# Patient Record
Sex: FEMALE | Race: WHITE | NOT HISPANIC OR LATINO | URBAN - METROPOLITAN AREA
[De-identification: names, ages, dates, MRNs, and addresses within clinical notes are randomized per-mention and may not be internally consistent; named-entity substitution may affect disease eponyms.]

---
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
## 2019-03-21 ENCOUNTER — APPOINTMENT (OUTPATIENT)
Dept: PEDIATRICS | Facility: CLINIC | Age: 19
End: 2019-03-21
Payer: COMMERCIAL

## 2019-03-21 VITALS — TEMPERATURE: 97 F

## 2019-03-21 DIAGNOSIS — J02.9 ACUTE PHARYNGITIS, UNSPECIFIED: ICD-10-CM

## 2019-03-21 DIAGNOSIS — Z20.828 CONTACT WITH AND (SUSPECTED) EXPOSURE TO OTHER VIRAL COMMUNICABLE DISEASES: ICD-10-CM

## 2019-03-21 LAB — S PYO AG SPEC QL IA: NEGATIVE

## 2019-03-21 PROCEDURE — 87880 STREP A ASSAY W/OPTIC: CPT | Mod: QW

## 2019-03-21 PROCEDURE — 99215 OFFICE O/P EST HI 40 MIN: CPT

## 2019-03-21 RX ORDER — AMOXICILLIN 500 MG/1
500 CAPSULE ORAL
Qty: 28 | Refills: 0 | Status: COMPLETED | COMMUNITY
Start: 2018-11-12

## 2019-03-21 RX ORDER — OSELTAMIVIR PHOSPHATE 75 MG/1
75 CAPSULE ORAL
Qty: 10 | Refills: 0 | Status: DISCONTINUED | COMMUNITY
Start: 2018-01-30 | End: 2019-03-21

## 2019-03-21 NOTE — HISTORY OF PRESENT ILLNESS
[de-identified] : Rash and swelling of hands and ankles [FreeTextEntry6] : 2 nights ago pt started with a red blotchy itchy rash on elbows, the next day the rash was on her ankles, wrists and knees.  Pt also noted swelling of her fingers and they felt tight when bending.  In the past 4 months she has noted some swelling of her fingers and knuckles in AM which resolved within less than an hours.  Pt had a runny or stuffy nose x 5 days, mild ST pain 2/10, no fever, no body aches, no chills, no N/V/D.  No SA.  HA 6 days ago x 1 day took Tylenol.  Started a new Vitamin [employer] MVI gummies with collagen.  Pt took Benadryl last night which helped a little with itch.  Pt has allergies to Augmentin and Erythromycin.  Had flushed cheeks yesterday, rash on ear lobes.

## 2019-03-21 NOTE — PHYSICAL EXAM
[Clear Effusion] : clear effusion [Erythematous Oropharynx] : erythematous oropharynx [Tender] : tender [Enlarged] : enlarged [Anterior Cervical] : anterior cervical [Moves All Extremities x 4] : moves all extremities x4 [NL] : normotonic [FreeTextEntry4] : nasal congestion [de-identified] : red macular lacy rash on dorsum feet, wrists, elbows and knees.  No edema.  FROM elbows, wrists, knees and ankles, no pitting edema or increased warmth.

## 2019-03-21 NOTE — REVIEW OF SYSTEMS
[Headache] : headache [Nasal Congestion] : nasal congestion [Sore Throat] : sore throat [Rash] : rash [Itching] : itching [Negative] : Genitourinary

## 2019-03-23 LAB
B19V IGG SER QL IA: 0.3 INDEX
B19V IGG+IGM SER-IMP: NEGATIVE
B19V IGG+IGM SER-IMP: NORMAL
B19V IGM FLD-ACNC: 0.2 INDEX
B19V IGM SER-ACNC: NEGATIVE
BASOPHILS # BLD AUTO: 0.02 K/UL
BASOPHILS NFR BLD AUTO: 0.2 %
CRP SERPL-MCNC: <0.1 MG/DL
DSDNA AB SER-ACNC: <12 IU/ML
EOSINOPHIL # BLD AUTO: 0.08 K/UL
EOSINOPHIL NFR BLD AUTO: 1 %
ERYTHROCYTE [SEDIMENTATION RATE] IN BLOOD BY WESTERGREN METHOD: 2 MM/HR
HCT VFR BLD CALC: 39.8 %
HGB BLD-MCNC: 12.2 G/DL
IMM GRANULOCYTES NFR BLD AUTO: 0.2 %
LYMPHOCYTES # BLD AUTO: 2.04 K/UL
LYMPHOCYTES NFR BLD AUTO: 24.5 %
MAN DIFF?: NORMAL
MCHC RBC-ENTMCNC: 23.7 PG
MCHC RBC-ENTMCNC: 30.7 GM/DL
MCV RBC AUTO: 77.4 FL
MONOCYTES # BLD AUTO: 0.49 K/UL
MONOCYTES NFR BLD AUTO: 5.9 %
NEUTROPHILS # BLD AUTO: 5.66 K/UL
NEUTROPHILS NFR BLD AUTO: 68.2 %
PLATELET # BLD AUTO: 259 K/UL
RBC # BLD: 5.14 M/UL
RBC # FLD: 15.1 %
RHEUMATOID FACT SER QL: <10 IU/ML
T4 FREE SERPL-MCNC: 1.3 NG/DL
TSH SERPL-ACNC: 1.15 UIU/ML
WBC # FLD AUTO: 8.31 K/UL

## 2019-03-24 LAB — BACTERIA THROAT CULT: NORMAL

## 2019-03-25 ENCOUNTER — RESULT REVIEW (OUTPATIENT)
Age: 19
End: 2019-03-25

## 2019-03-25 LAB
ANA SER IF-ACNC: NEGATIVE
B BURGDOR AB SER-IMP: NEGATIVE
B BURGDOR IGM PATRN SER IB-IMP: NEGATIVE
B BURGDOR18/20KD IGM SER QL IB: NORMAL
B BURGDOR18KD IGG SER QL IB: NORMAL
B BURGDOR23KD IGG SER QL IB: NORMAL
B BURGDOR23KD IGM SER QL IB: NORMAL
B BURGDOR28KD AB SER QL IB: NORMAL
B BURGDOR28KD IGG SER QL IB: NORMAL
B BURGDOR30KD AB SER QL IB: NORMAL
B BURGDOR30KD IGG SER QL IB: NORMAL
B BURGDOR31KD IGG SER QL IB: NORMAL
B BURGDOR31KD IGM SER QL IB: NORMAL
B BURGDOR39KD IGG SER QL IB: NORMAL
B BURGDOR39KD IGM SER QL IB: NORMAL
B BURGDOR41KD IGG SER QL IB: PRESENT
B BURGDOR41KD IGM SER QL IB: PRESENT
B BURGDOR45KD AB SER QL IB: NORMAL
B BURGDOR45KD IGG SER QL IB: NORMAL
B BURGDOR58KD AB SER QL IB: NORMAL
B BURGDOR58KD IGG SER QL IB: NORMAL
B BURGDOR66KD IGG SER QL IB: NORMAL
B BURGDOR66KD IGM SER QL IB: NORMAL
B BURGDOR93KD IGG SER QL IB: NORMAL
B BURGDOR93KD IGM SER QL IB: NORMAL

## 2019-05-20 PROBLEM — J06.9 ACUTE URI: Status: RESOLVED | Noted: 2019-03-21 | Resolved: 2019-05-20

## 2019-05-20 PROBLEM — Z87.2 HISTORY OF VIRAL EXANTHEM: Status: RESOLVED | Noted: 2019-03-21 | Resolved: 2019-05-20

## 2019-05-20 PROBLEM — Z87.39 HISTORY OF JOINT SWELLING: Status: RESOLVED | Noted: 2019-03-21 | Resolved: 2019-05-20

## 2019-05-20 PROBLEM — B08.3 FIFTH DISEASE: Status: RESOLVED | Noted: 2019-03-21 | Resolved: 2019-05-20

## 2019-05-21 ENCOUNTER — APPOINTMENT (OUTPATIENT)
Dept: PEDIATRICS | Facility: CLINIC | Age: 19
End: 2019-05-21

## 2019-05-21 DIAGNOSIS — Z87.2 PERSONAL HISTORY OF DISEASES OF THE SKIN AND SUBCUTANEOUS TISSUE: ICD-10-CM

## 2019-05-21 DIAGNOSIS — Z87.39 PERSONAL HISTORY OF OTHER DISEASES OF THE MUSCULOSKELETAL SYSTEM AND CONNECTIVE TISSUE: ICD-10-CM

## 2019-05-21 DIAGNOSIS — B08.3 ERYTHEMA INFECTIOSUM [FIFTH DISEASE]: ICD-10-CM

## 2019-05-21 DIAGNOSIS — J06.9 ACUTE UPPER RESPIRATORY INFECTION, UNSPECIFIED: ICD-10-CM

## 2019-09-20 ENCOUNTER — APPOINTMENT (OUTPATIENT)
Dept: PEDIATRICS | Facility: CLINIC | Age: 19
End: 2019-09-20
Payer: COMMERCIAL

## 2019-09-20 VITALS — TEMPERATURE: 98.1 F

## 2019-09-20 PROCEDURE — 99214 OFFICE O/P EST MOD 30 MIN: CPT

## 2019-09-20 RX ORDER — HYDROCORTISONE VALERATE 2 MG/G
0.2 CREAM TOPICAL
Qty: 15 | Refills: 0 | Status: ACTIVE | COMMUNITY
Start: 2019-09-06

## 2019-09-20 RX ORDER — CLINDAMYCIN PHOSPHATE 10 MG/ML
1 LOTION TOPICAL
Qty: 60 | Refills: 0 | Status: ACTIVE | COMMUNITY
Start: 2019-09-06

## 2019-09-20 NOTE — DISCUSSION/SUMMARY
[FreeTextEntry1] : 20 y/o F with Left Otalgia/TMJ tenderness/Possible wisdom teeth causing pain-\par Warm compresses or heating pad/Massage and acupressure discussed/Soft foods/Soups/Smoothies/Increase clear fluids/Advil or Motrin every 6-8 hours as needed for the next few days\par Advised to make appt with dentist to evaluate wisdom teeth\par Check back any concerns.

## 2019-09-20 NOTE — HISTORY OF PRESENT ILLNESS
[de-identified] : Ear pain [FreeTextEntry6] : Started with left ear clogged about 3 days ago with decreased hearing.  Mild nasal congestion.  Sneezing.  Mild HAS.  No sore throat.  No coughing.  No CP/SOB.  No SA/N/V/D/C/loose stools.  No one else sick at home.  Sick contacts at school.

## 2019-09-20 NOTE — REVIEW OF SYSTEMS
[Headache] : headache [Ear Pain] : ear pain [Nasal Congestion] : nasal congestion [Negative] : Heme/Lymph

## 2019-09-20 NOTE — PHYSICAL EXAM
[No Acute Distress] : no acute distress [Alert] : alert [Normocephalic] : normocephalic [EOMI] : EOMI [Clear TM bilaterally] : clear tympanic membranes bilaterally [Pink Nasal Mucosa] : pink nasal mucosa [Nonerythematous Oropharynx] : nonerythematous oropharynx [Supple] : supple [Clear to Ausculatation Bilaterally] : clear to auscultation bilaterally [Regular Rate and Rhythm] : regular rate and rhythm [Soft] : soft [NonTender] : non tender

## 2019-11-07 PROBLEM — H92.02 LEFT EAR PAIN: Status: RESOLVED | Noted: 2019-09-20 | Resolved: 2019-11-07

## 2019-11-07 PROBLEM — M26.622 TENDERNESS OF LEFT TEMPOROMANDIBULAR JOINT: Status: RESOLVED | Noted: 2019-09-20 | Resolved: 2019-11-07

## 2019-11-07 PROBLEM — Z00.00 WELL ADULT EXAM: Status: ACTIVE | Noted: 2017-10-13

## 2019-11-14 ENCOUNTER — APPOINTMENT (OUTPATIENT)
Dept: PEDIATRICS | Facility: CLINIC | Age: 19
End: 2019-11-14
Payer: COMMERCIAL

## 2019-11-14 VITALS
HEART RATE: 67 BPM | BODY MASS INDEX: 32.24 KG/M2 | DIASTOLIC BLOOD PRESSURE: 68 MMHG | WEIGHT: 203 LBS | SYSTOLIC BLOOD PRESSURE: 114 MMHG | HEIGHT: 66.5 IN

## 2019-11-14 DIAGNOSIS — Z23 ENCOUNTER FOR IMMUNIZATION: ICD-10-CM

## 2019-11-14 DIAGNOSIS — M26.622 ARTHRALGIA OF LEFT TEMPOROMANDIBULAR JOINT: ICD-10-CM

## 2019-11-14 DIAGNOSIS — H92.02 OTALGIA, LEFT EAR: ICD-10-CM

## 2019-11-14 DIAGNOSIS — Z00.00 ENCOUNTER FOR GENERAL ADULT MEDICAL EXAMINATION W/OUT ABNORMAL FINDINGS: ICD-10-CM

## 2019-11-14 DIAGNOSIS — E66.3 OVERWEIGHT: ICD-10-CM

## 2019-11-14 PROCEDURE — 90686 IIV4 VACC NO PRSV 0.5 ML IM: CPT

## 2019-11-14 PROCEDURE — 96160 PT-FOCUSED HLTH RISK ASSMT: CPT | Mod: 59

## 2019-11-14 PROCEDURE — 96127 BRIEF EMOTIONAL/BEHAV ASSMT: CPT

## 2019-11-14 PROCEDURE — 99395 PREV VISIT EST AGE 18-39: CPT | Mod: 25

## 2019-11-14 PROCEDURE — 90471 IMMUNIZATION ADMIN: CPT

## 2019-11-14 NOTE — DISCUSSION/SUMMARY
[] : The components of the vaccine(s) to be administered today are listed in the plan of care. The disease(s) for which the vaccine(s) are intended to prevent and the risks have been discussed with the caretaker.  The risks are also included in the appropriate vaccination information statements which have been provided to the patient's caregiver.  The caregiver has given consent to vaccinate. [FreeTextEntry1] : 18 y/o F- Doing well\par Normal Exam, except for being overweight\par Discussion regarding the influence that being overweight  has on future medical problems- Dyslipidemia/HTN/Diabetes, as well as psychological effects, such as depression, self esteem issues and social isolation. [place] goal should be targeted to <85% BMI for age and sex. A balanced weight reduction diet focused on healthy lifestyle practices was recommended. Behavior modification such as regarding proper eating habits- Increase proteins and decrease carbs, keeping a food diary, eating more slowly. 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 Barcenas- F/U every 6 months with Breast US - sees every 6 months with US - last time 10/19- no changes\par Flu vaccine given\par Form for blood work given\par Discussion regarding alcohol, smoking, drugs and sexual activity.  We discussed the negative effects drugs and alcohol can have on then emotionally, physically, mentally.  Their use can effect how they perform in school and with their extracurricular activities. We discussed how smoking, including e cigarettes and vaping can also have bad effects.    We discussed ways to deal with peer pressure and to not get in a car with someone who has been drinking and/or taking drugs.  We talked about various STIs and safe sex practices.\par I recommended that the patient participates in 60 minutes or more of physical activity a day.  As an older child, I encouraged structured physical activity when possible (ie, participation in team or individual sports, or supervised exercise sessions). I explained that the patient would be more likely to participate consistently in these activities because they would be accountable to a  or leader. I also suggested engaging in a gym or fitness center if possible. \par Next CP in 1 year.

## 2019-11-14 NOTE — PHYSICAL EXAM
[Alert] : alert [EOMI Bilateral] : EOMI bilateral [No Acute Distress] : no acute distress [Normocephalic] : normocephalic [Clear tympanic membranes with bony landmarks and light reflex present bilaterally] : clear tympanic membranes with bony landmarks and light reflex present bilaterally  [Pink Nasal Mucosa] : pink nasal mucosa [Supple, full passive range of motion] : supple, full passive range of motion [No Palpable Masses] : no palpable masses [Nonerythematous Oropharynx] : nonerythematous oropharynx [Clear to Ausculatation Bilaterally] : clear to auscultation bilaterally [Normal S1, S2 audible] : normal S1, S2 audible [Regular Rate and Rhythm] : regular rate and rhythm [No Murmurs] : no murmurs [+2 Femoral Pulses] : +2 femoral pulses [NonTender] : non tender [Soft] : soft [Normoactive Bowel Sounds] : normoactive bowel sounds [Non Distended] : non distended [No Splenomegaly] : no splenomegaly [No Hepatomegaly] : no hepatomegaly [Zev: _____] : Zev [unfilled] [Zev: ____] : Zev [unfilled] [Normal Muscle Tone] : normal muscle tone [No Abnormal Lymph Nodes Palpated] : no abnormal lymph nodes palpated [No Gait Asymmetry] : no gait asymmetry [No pain or deformities with palpation of bone, muscles, joints] : no pain or deformities with palpation of bone, muscles, joints [Straight] : straight [+2 Patella DTR] : +2 patella DTR [Cranial Nerves Grossly Intact] : cranial nerves grossly intact [No Rash or Lesions] : no rash or lesions

## 2019-11-14 NOTE — HISTORY OF PRESENT ILLNESS
[Up to date] : Up to date [Yes] : Patient goes to dentist yearly [Normal] : normal [Age of Menarche: ____] : Age of Menarche: [unfilled] [Eats meals with family] : eats meals with family [Is permitted and is able to make independent decisions] : Is permitted and is able to make independent decisions [Has family members/adults to turn to for help] : has family members/adults to turn to for help [Normal Behavior/Attention] : normal behavior/attention [Normal Performance] : normal performance [Eats regular meals including adequate fruits and vegetables] : eats regular meals including adequate fruits and vegetables [Drinks non-sweetened liquids] : drinks non-sweetened liquids  [Normal Homework] : normal homework [Calcium source] : calcium source [At least 1 hour of physical activity a day] : at least 1 hour of physical activity a day [Has friends] : has friends [Screen time (except homework) less than 2 hours a day] : screen time (except homework) less than 2 hours a day [Has interests/participates in community activities/volunteers] : has interests/participates in community activities/volunteers. [Uses safety belts/safety equipment] : uses safety belts/safety equipment  [Has peer relationships free of violence] : has peer relationships free of violence [Has ways to cope with stress] : has ways to cope with stress [Displays self-confidence] : displays self-confidence [LMP: _____] : LMP: [unfilled] [No] : Patient has not had sexual intercourse. [Sleep Concerns] : no sleep concerns [Has concerns about body or appearance] : does not have concerns about body or appearance [Uses electronic nicotine delivery system] : does not use electronic nicotine delivery system [Uses drugs] : does not use drugs  [Uses tobacco] : does not use tobacco [Impaired/distracted driving] : no impaired/distracted driving [Drinks alcohol] : does not drink alcohol [Gets depressed, anxious, or irritable/has mood swings] : does not get depressed, anxious, or irritable/has mood swings [Has problems with sleep] : does not have problems with sleep [Has thought about hurting self or considered suicide] : has not thought about hurting self or considered suicide [de-identified] : [employer]- going to [place] in January [FreeTextEntry1] : 3/24/17 - Phyllodes neoplasm of the breast removed- Dr. Barcenas- F/U every 6 months with Breast US- sees every 6 months with US - last time 10/19- no changes [de-identified] : Goes to the gym

## 2020-06-25 ENCOUNTER — LABORATORY RESULT (OUTPATIENT)
Age: 20
End: 2020-06-25

## 2020-06-25 DIAGNOSIS — E55.9 VITAMIN D DEFICIENCY, UNSPECIFIED: ICD-10-CM

## 2020-06-25 DIAGNOSIS — Z20.828 CONTACT WITH AND (SUSPECTED) EXPOSURE TO OTHER VIRAL COMMUNICABLE DISEASES: ICD-10-CM

## 2020-06-26 LAB
25(OH)D3 SERPL-MCNC: 40.1 NG/ML
ALBUMIN SERPL ELPH-MCNC: 4.4 G/DL
ALP BLD-CCNC: 39 U/L
ALT SERPL-CCNC: 8 U/L
ANION GAP SERPL CALC-SCNC: 14 MMOL/L
AST SERPL-CCNC: 12 U/L
BASOPHILS # BLD AUTO: 0.03 K/UL
BASOPHILS NFR BLD AUTO: 0.8 %
BILIRUB SERPL-MCNC: 0.5 MG/DL
BUN SERPL-MCNC: 5 MG/DL
CALCIUM SERPL-MCNC: 9.3 MG/DL
CHLORIDE SERPL-SCNC: 105 MMOL/L
CO2 SERPL-SCNC: 23 MMOL/L
CREAT SERPL-MCNC: 0.78 MG/DL
EOSINOPHIL # BLD AUTO: 0.1 K/UL
EOSINOPHIL NFR BLD AUTO: 2.5 %
GLUCOSE SERPL-MCNC: 90 MG/DL
HCT VFR BLD CALC: 39.7 %
HGB BLD-MCNC: 12.3 G/DL
IMM GRANULOCYTES NFR BLD AUTO: 0.3 %
LYMPHOCYTES # BLD AUTO: 1.39 K/UL
LYMPHOCYTES NFR BLD AUTO: 35 %
MAN DIFF?: NORMAL
MCHC RBC-ENTMCNC: 24.4 PG
MCHC RBC-ENTMCNC: 31 GM/DL
MCV RBC AUTO: 78.6 FL
MONOCYTES # BLD AUTO: 0.24 K/UL
MONOCYTES NFR BLD AUTO: 6 %
NEUTROPHILS # BLD AUTO: 2.2 K/UL
NEUTROPHILS NFR BLD AUTO: 55.4 %
PLATELET # BLD AUTO: 275 K/UL
POTASSIUM SERPL-SCNC: 4.1 MMOL/L
PROT SERPL-MCNC: 6.6 G/DL
RBC # BLD: 5.05 M/UL
RBC # FLD: 15.9 %
SARS-COV-2 IGG SERPL IA-ACNC: 1.53 INDEX
SARS-COV-2 IGG SERPL QL IA: POSITIVE
SODIUM SERPL-SCNC: 142 MMOL/L
T3FREE SERPL-MCNC: 2.86 PG/ML
T4 FREE SERPL-MCNC: 1.4 NG/DL
THYROGLOB AB SERPL-ACNC: <20 IU/ML
THYROPEROXIDASE AB SERPL IA-ACNC: <10 IU/ML
TSH SERPL-ACNC: 0.77 UIU/ML
WBC # FLD AUTO: 3.97 K/UL

## 2021-08-11 ENCOUNTER — RESULT REVIEW (OUTPATIENT)
Age: 21
End: 2021-08-11

## 2022-01-12 ENCOUNTER — TRANSCRIPTION ENCOUNTER (OUTPATIENT)
Age: 22
End: 2022-01-12

## 2022-01-20 ENCOUNTER — TRANSCRIPTION ENCOUNTER (OUTPATIENT)
Age: 22
End: 2022-01-20

## 2022-01-27 ENCOUNTER — TRANSCRIPTION ENCOUNTER (OUTPATIENT)
Age: 22
End: 2022-01-27

## 2022-02-03 ENCOUNTER — TRANSCRIPTION ENCOUNTER (OUTPATIENT)
Age: 22
End: 2022-02-03

## 2022-02-16 ENCOUNTER — TRANSCRIPTION ENCOUNTER (OUTPATIENT)
Age: 22
End: 2022-02-16

## 2022-02-27 ENCOUNTER — TRANSCRIPTION ENCOUNTER (OUTPATIENT)
Age: 22
End: 2022-02-27

## 2022-03-03 ENCOUNTER — TRANSCRIPTION ENCOUNTER (OUTPATIENT)
Age: 22
End: 2022-03-03

## 2022-03-10 ENCOUNTER — TRANSCRIPTION ENCOUNTER (OUTPATIENT)
Age: 22
End: 2022-03-10

## 2022-03-17 ENCOUNTER — TRANSCRIPTION ENCOUNTER (OUTPATIENT)
Age: 22
End: 2022-03-17

## 2022-03-24 ENCOUNTER — TRANSCRIPTION ENCOUNTER (OUTPATIENT)
Age: 22
End: 2022-03-24

## 2022-04-01 ENCOUNTER — TRANSCRIPTION ENCOUNTER (OUTPATIENT)
Age: 22
End: 2022-04-01

## 2022-04-07 ENCOUNTER — TRANSCRIPTION ENCOUNTER (OUTPATIENT)
Age: 22
End: 2022-04-07

## 2022-04-24 ENCOUNTER — TRANSCRIPTION ENCOUNTER (OUTPATIENT)
Age: 22
End: 2022-04-24

## 2022-04-30 ENCOUNTER — APPOINTMENT (OUTPATIENT)
Dept: ULTRASOUND IMAGING | Facility: CLINIC | Age: 22
End: 2022-04-30
Payer: COMMERCIAL

## 2022-04-30 ENCOUNTER — OUTPATIENT (OUTPATIENT)
Dept: OUTPATIENT SERVICES | Facility: HOSPITAL | Age: 22
LOS: 1 days | End: 2022-04-30
Payer: COMMERCIAL

## 2022-04-30 DIAGNOSIS — Z00.8 ENCOUNTER FOR OTHER GENERAL EXAMINATION: ICD-10-CM

## 2022-04-30 PROCEDURE — 76536 US EXAM OF HEAD AND NECK: CPT | Mod: 26

## 2022-04-30 PROCEDURE — 76536 US EXAM OF HEAD AND NECK: CPT

## 2022-11-07 ENCOUNTER — NON-APPOINTMENT (OUTPATIENT)
Age: 22
End: 2022-11-07